# Patient Record
Sex: MALE | Race: BLACK OR AFRICAN AMERICAN | NOT HISPANIC OR LATINO | URBAN - METROPOLITAN AREA
[De-identification: names, ages, dates, MRNs, and addresses within clinical notes are randomized per-mention and may not be internally consistent; named-entity substitution may affect disease eponyms.]

---

## 2024-01-30 ENCOUNTER — EMERGENCY (EMERGENCY)
Facility: HOSPITAL | Age: 44
LOS: 1 days | Discharge: HOME | End: 2024-01-30
Attending: STUDENT IN AN ORGANIZED HEALTH CARE EDUCATION/TRAINING PROGRAM | Admitting: STUDENT IN AN ORGANIZED HEALTH CARE EDUCATION/TRAINING PROGRAM
Payer: COMMERCIAL

## 2024-01-30 VITALS
TEMPERATURE: 97 F | DIASTOLIC BLOOD PRESSURE: 95 MMHG | HEART RATE: 59 BPM | WEIGHT: 270.07 LBS | RESPIRATION RATE: 16 BRPM | SYSTOLIC BLOOD PRESSURE: 170 MMHG | OXYGEN SATURATION: 100 %

## 2024-01-30 PROCEDURE — 76870 US EXAM SCROTUM: CPT | Mod: 26

## 2024-01-30 PROCEDURE — 99284 EMERGENCY DEPT VISIT MOD MDM: CPT

## 2024-01-30 NOTE — ED PROVIDER NOTE - PATIENT PORTAL LINK FT
You can access the FollowMyHealth Patient Portal offered by [facility] by registering at the following website: [email or web address]. By joining [employer]’s FollowMyHealth portal, you will also be able to view your health information using other applications (apps) compatible with our system.

## 2024-01-30 NOTE — ED PROVIDER NOTE - CLINICAL SUMMARY MEDICAL DECISION MAKING FREE TEXT BOX
Patient is a 43-year-old male no stated past medical history is presenting to the emergency department with right-sided testicular pain.  Started earlier yesterday prior to presentation.  Pain was 10 out of 10 and so severe that the patient called EMS prompting his transport to the emergency department.  Pain has since subsided.  No dysuria no flank pain, no abdominal tenderness to palpation.  At time of interview in the ED patient's pain had completely resolved.  Patient is well-appearing vital signs are reviewed and significant for hypertension that not clinically significant at this time abdomen soft nontender nondistended, no CVA tenderness to palpation, testicles with normal lie, positive cremasteric bilaterally.  No inguinal hernias.  Will send for testicular ultrasound to rule out torsion.  Given no pain no pharmacologic intervention indicated at this time.  Anticipate discharge with urology follow-up. Patient is a 43-year-old male no stated past medical history is presenting to the emergency department with right-sided testicular pain.  Started earlier yesterday prior to presentation.  Pain was 10 out of 10 and so severe that the patient called EMS prompting his transport to the emergency department.  Pain has since subsided.  No dysuria no flank pain, no abdominal tenderness to palpation.  At time of interview in the ED patient's pain had completely resolved.  Patient is well-appearing vital signs are reviewed and significant for hypertension that not clinically significant at this time abdomen soft nontender nondistended, no CVA tenderness to palpation, testicles with normal lie, positive cremasteric bilaterally.  No inguinal hernias.  Will send for testicular ultrasound to rule out torsion.  Given no pain no pharmacologic intervention indicated at this time.  Anticipate discharge with urology follow-up.    ===================================  update (Basil Lewis MD; attending emergency medicine and medical toxicology)      Patient has small bilateral hydroceles on ultrasound, will follow-up urology.

## 2024-01-30 NOTE — ED ADULT TRIAGE NOTE - CHIEF COMPLAINT QUOTE
Pt c/o groin pain since 930pm. States pain radiates to right side of abdomen. Denies n/v/d, fevers, chills, difficulty urinating, hematuria, flank pain. No past medical hx Pt c/o groin pain since 930pm. Started out as R sided testicular pain radiating to right side of abdomen. Denies n/v/d, fevers, chills, difficulty urinating, hematuria, flank pain, swelling. No past medical hx Pt c/o groin pain since 930pm. Started out as R sided testicular pain radiating to right side of abdomen. Denies n/v/d, fevers, chills, difficulty urinating, hematuria, flank pain, swelling, dysuria, penile discharge. No past medical hx